# Patient Record
(demographics unavailable — no encounter records)

---

## 2024-12-16 NOTE — HISTORY OF PRESENT ILLNESS
[FreeTextEntry1] : Patient is a 34 year old, presenting for follow up s/p c/o hematuria w/ clots 1 week ago that lasted for a few days.  Recent travel to [place] where this occurred. Reports she had urinary pain that began last Tuesday.  Pt was prescribed Macrobid on 12/11 which she took for a few days.  Returned from [place] on Saturday and denies hematuria or symptoms at present.  Has a few more days of abx left.   LMP: 12/4/24   GYNHx: Denies abnl pap smears Denies fibroids/endometriosis/cysts Denies STIs   SexualHx: Most recent encounter was 1 week ago   Contraception: condoms   Occupation:  in [employer].

## 2024-12-16 NOTE — PLAN
[FreeTextEntry1] : #UTI Symptoms - UA/Urine Culture obtained - Discussed importance of continued hydration and bladder hygiene - Discussed options for OTC management of symptoms - Pt instructed to complete prescription as prescribed - Will follow up once labs are resulted.

## 2025-02-18 NOTE — PLAN
[FreeTextEntry1] : Discussed possible PMDD given timing of symptoms and options of SSRI during luteal phase vs. trial of OCPs. Pt unsure if wants to TTC. Discussed trial of SSRI during luteal phase, behavioral health referral placed. PHQ9 score of 5.  Resources discussed and given. Reviewed over the course of the visit 5-10minutes of face to face time. Follow up with changes in mood including other symptoms of anxiety. RTO 2-3mo.

## 2025-02-18 NOTE — HISTORY OF PRESENT ILLNESS
[FreeTextEntry1] : 33yo  s/p  2023 here to discuss emotional changes chika during luteal phase. Feels like these symptoms started 2-3 months ago. Had mild postpartum blues postpartum that resolved. Also noted emotional lability when weaning 2024. Symptoms then improved but over last 2-3 months sometimes feels intense anger, frustration , intense overwhelming feeling and helplessness chika after ovulation, denies crying spells. Occ feelings of guilt.  Currently going to therapist since 6-8 months post delivery.   Pap 2023 nilm/hpv neg